# Patient Record
Sex: FEMALE | Race: WHITE | NOT HISPANIC OR LATINO | Employment: UNEMPLOYED | URBAN - NONMETROPOLITAN AREA
[De-identification: names, ages, dates, MRNs, and addresses within clinical notes are randomized per-mention and may not be internally consistent; named-entity substitution may affect disease eponyms.]

---

## 2024-09-24 ENCOUNTER — HOSPITAL ENCOUNTER (EMERGENCY)
Facility: HOSPITAL | Age: 64
Discharge: HOME | End: 2024-09-24
Attending: STUDENT IN AN ORGANIZED HEALTH CARE EDUCATION/TRAINING PROGRAM
Payer: MEDICARE

## 2024-09-24 VITALS
OXYGEN SATURATION: 97 % | WEIGHT: 140 LBS | TEMPERATURE: 97 F | HEIGHT: 65 IN | HEART RATE: 89 BPM | SYSTOLIC BLOOD PRESSURE: 133 MMHG | RESPIRATION RATE: 17 BRPM | BODY MASS INDEX: 23.32 KG/M2 | DIASTOLIC BLOOD PRESSURE: 84 MMHG

## 2024-09-24 DIAGNOSIS — T85.528A GASTROJEJUNOSTOMY TUBE DISLODGEMENT: Primary | ICD-10-CM

## 2024-09-24 PROCEDURE — 99283 EMERGENCY DEPT VISIT LOW MDM: CPT

## 2024-09-24 RX ORDER — POLYETHYLENE GLYCOL 3350 17 G/17G
17 POWDER, FOR SOLUTION ORAL DAILY
COMMUNITY
Start: 2024-09-17

## 2024-09-24 RX ORDER — ACETAMINOPHEN 325 MG/1
650 TABLET ORAL EVERY 6 HOURS PRN
COMMUNITY
Start: 2024-09-17

## 2024-09-24 RX ORDER — VENLAFAXINE HYDROCHLORIDE 75 MG/1
75 CAPSULE, EXTENDED RELEASE ORAL DAILY
COMMUNITY

## 2024-09-24 RX ORDER — FOLIC ACID 1 MG/1
1 TABLET ORAL DAILY
COMMUNITY
Start: 2024-09-17

## 2024-09-24 RX ORDER — HYDROXYZINE HYDROCHLORIDE 25 MG/1
25 TABLET, FILM COATED ORAL EVERY 6 HOURS PRN
COMMUNITY
Start: 2024-09-17

## 2024-09-24 RX ORDER — DAPAGLIFLOZIN 10 MG/1
10 TABLET, FILM COATED ORAL DAILY
COMMUNITY

## 2024-09-24 RX ORDER — LANOLIN ALCOHOL/MO/W.PET/CERES
100 CREAM (GRAM) TOPICAL DAILY
COMMUNITY
Start: 2024-09-17

## 2024-09-24 RX ORDER — BIOTIN 10 MG
10000 TABLET ORAL DAILY
COMMUNITY

## 2024-09-24 RX ORDER — FAMOTIDINE 20 MG/1
20 TABLET, FILM COATED ORAL 2 TIMES DAILY
COMMUNITY
Start: 2024-09-17

## 2024-09-24 RX ORDER — GABAPENTIN 400 MG/1
800 CAPSULE ORAL 3 TIMES DAILY
COMMUNITY
Start: 2024-09-17

## 2024-09-24 RX ORDER — CETIRIZINE HYDROCHLORIDE 10 MG/1
10 TABLET ORAL DAILY
COMMUNITY

## 2024-09-24 RX ORDER — CAPSAICIN 0.75 MG/G
1 CREAM TOPICAL 3 TIMES DAILY
COMMUNITY
Start: 2024-09-17

## 2024-09-24 RX ORDER — AMOXICILLIN 250 MG
1 CAPSULE ORAL 2 TIMES DAILY
COMMUNITY
Start: 2024-09-17

## 2024-09-24 RX ORDER — GUAIFENESIN 600 MG/1
600 TABLET, EXTENDED RELEASE ORAL
COMMUNITY
Start: 2024-09-17 | End: 2024-10-17

## 2024-09-24 RX ORDER — OXYCODONE AND ACETAMINOPHEN 10; 325 MG/1; MG/1
1 TABLET ORAL EVERY 6 HOURS PRN
COMMUNITY

## 2024-09-24 RX ORDER — INSULIN GLARGINE 100 [IU]/ML
35 INJECTION, SOLUTION SUBCUTANEOUS
COMMUNITY
Start: 2024-09-17

## 2024-09-24 RX ORDER — ATORVASTATIN CALCIUM 40 MG/1
40 TABLET, FILM COATED ORAL DAILY
COMMUNITY

## 2024-09-24 NOTE — ED NOTES
Att I called [facility] for transport of pt back to [facility]

## 2024-09-24 NOTE — ED PROVIDER NOTES
[facility] EMERGENCY DEPARTMENT  Emergency Department Encounter  Emergency Medicine Physician Note       Pt Name: Tram Isaacs  MRN: [id number]  Pt :   1960  Room Number:  1414  Date of encounter:  2024  PCP: Tod Weems MD  ED Physician: Malik Beck DO    HPI:  Tram Isaacs is a 64 y.o. female who presents to the ED with chief complaint of GJ tube dislodgement.  Patient presents to [facility] re[facility].  GJ tube became displaced throughout the night last night.  Patient was brought to the ED for evaluation.  Upon arrival, patient is alert, but is confused.  She is unsure how the GJ tube became displaced.  She denies any medical complaints.    HPI limited secondary to mental status.    PAST MEDICAL HISTORY  No past medical history on file.  Current Outpatient Medications   Medication Instructions    acetaminophen (TYLENOL) 650 mg, Oral, Every 6 Hours PRN    apixaban (ELIQUIS) 5 mg, Oral    atorvastatin (LIPITOR) 40 mg, Oral, Daily    Biotin 10,000 mcg, Oral, Daily    capsaicin (ZOSTRIX) 0.075 % topical cream 1 Application, Topical, 3 Times Daily    cetirizine (ZYRTEC) 10 mg, Oral, Daily    cyanocobalamin (VITAMIN B-12) 250 mcg, Oral, Daily    dapagliflozin Propanediol 10 mg, Oral, Daily    diphenhydrAMINE-acetaminophen (TYLENOL PM)  MG tablet per tablet 1 tablet, Oral    famotidine (PEPCID) 20 mg, Oral, 2 Times Daily    folic acid (FOLVITE) 1 mg, Oral, Daily    gabapentin (NEURONTIN) 800 mg, Oral, 3 Times Daily    guaiFENesin (MUCINEX) 600 mg, Oral    hydrOXYzine (ATARAX) 25 mg, Oral, Every 6 Hours PRN    insulin glargine (LANTUS) 35 Units, Subcutaneous    naloxone (NARCAN) 4 mg, Nasal    pancrelipase, Lip-Prot-Amyl, (CREON) 71810-27428 units capsule delayed-release particles capsule 2 capsules, Oral    polyethylene glycol (MIRALAX) 17 g, Oral, Daily    sennosides-docusate (PERICOLACE) 8.6-50 MG per tablet 1 tablet, Oral, 2 Times Daily    thiamine (VITAMIN  B1) 100 mg, Oral, Daily    venlafaxine XR (EFFEXOR-XR) 75 mg, Oral, Daily      PAST SURGICAL HISTORY  No past surgical history on file.    FAMILY HISTORY  No family history on file.    SOCIAL HISTORY  Social History     Socioeconomic History    Marital status:      ALLERGIES  Patient has no known allergies.    REVIEW OF SYSTEMS  All systems reviewed and negative except for those discussed in HPI.     PHYSICAL EXAM  ED Triage Vitals [09/24/24 0939]   Temp Heart Rate Resp BP SpO2   97 °F (36.1 °C) 100 16 146/80 97 %      Temp src Heart Rate Source Patient Position BP Location FiO2 (%)   Axillary Monitor Sitting Left arm --     I have reviewed the triage vital signs and nursing notes.    General: Alert.  Appears chronically ill, but nontoxic.  No acute distress.  Head: Normocephalic.  Atraumatic.  Eyes: No scleral icterus.  ENT: Moist mucous membranes.  Tracheostomy stoma present.  Cardiovascular: Regular rate and rhythm.  No murmurs.  No rubs.  2+ distal pulses bilaterally.  Respiratory: Equal breath sounds bilaterally.  No rales.  No rhonchi.  No wheezing.  GI: Stoma epigastric region.  Abdomen is soft.  Nondistended.  Nontender to palpation.  No rebound.  No guarding.  No CVA tenderness.  MSK: Moves all 4 extremities.  Neurologic: Confused with complex questioning.  No focal deficits.  Skin: No erythema.  No edema. No pallor. No cyanosis.    LAB RESULTS  No results found for this or any previous visit (from the past 24 hour(s)).    RADIOLOGY  No Radiology Exams Resulted Within Past 24 Hours    PROCEDURES  Procedures    RISK STRATIFICATION    MEDICAL DECISION MAKING  64 y.o. female with past medical history listed above who presents with GJ tube displacement.    Patient arrives via EMS.  I have reviewed the EMS documentation/notes and included that information in my HPI.    Vital signs within normal limits.    Medical screening exam completed.    Patient unable to provide complex history secondary to  confusion.    I was able to speak to the patient's son/power of , Elvis Coburncott, via telephone.  Additional history obtained.  Patient was hospitalized in June of this year for DKA and substance abuse.  Required intubation and prolonged ventilation.  Underwent multiple surgeries on her pancreas.  Ultimately required tracheostomy and GJ tube placement.  Patient was discharged to skilled nursing facility/rehab last month.  Patient does eat during the day, but they use GJ tube at night for Ensure.  He is unsure if she even uses the Ensure at night.  Reports that since discharge from the hospital previously patient is normally confused.  This is reportedly her baseline. Shared decision making discussed with the patient's son in regards to how to proceed with GJ tube dislodgement. Patient was informed of the indication, benefits, alternative diagnostic options, and risks including, but not limited to missed and/or delayed diagnosis, therefore leading to failure to treat. After discussion patient's son wishes to leave GJ tube out. Does not want surgical consultation for replacement. The patient's son is clinically not intoxicated, free from distracting pain, appears to have intact insight, judgment and reason and in my medical opinion has the capacity to make medical decisions on behalf of the patient.    No clinical indication for labs or imaging.    Patient was deemed medically stable for discharge back to skilled nursing facility with close outpatient follow-up and strict ED return precautions.    Social determinants of health impacting treatment or disposition: None    REPEAT VITAL SIGNS  AS OF 10:37 EDT VITALS:  BP - 146/80  HR - 100  TEMP - 97 °F (36.1 °C) (Axillary)  O2 SATS - 97%    DIAGNOSIS  Final diagnoses:   Gastrojejunostomy tube dislodgement     DISPOSITION  ED Disposition       ED Disposition   Discharge    Condition   Stable    Comment   --               PATIENT REFERRALS  Tod Weems MD  [address]  [facility] [id number] [phone]      PCP. 48 hours.            Please note that portions of this document were completed with voice recognition software.        Malik Beck DO  09/25/24 0953

## 2024-12-23 ENCOUNTER — READMISSION MANAGEMENT (OUTPATIENT)
Dept: CALL CENTER | Facility: HOSPITAL | Age: 64
End: 2024-12-23
Payer: MEDICARE

## 2024-12-24 NOTE — OUTREACH NOTE
Prep Survey      Flowsheet Row Responses   [facility] facility patient discharged from? Non-BH   Is LACE score < 7 ? Non-BH Discharge   Eligibility Not Eligible   What are the reasons patient is not eligible? Other  [Non [facility] provider]   Does the patient have one of the following disease processes/diagnoses(primary or secondary)? Other   Prep survey completed? Yes            ENE VO - Registered Nurse

## 2025-02-18 ENCOUNTER — READMISSION MANAGEMENT (OUTPATIENT)
Dept: CALL CENTER | Facility: HOSPITAL | Age: 65
End: 2025-02-18
Payer: MEDICARE

## 2025-02-19 NOTE — OUTREACH NOTE
Prep Survey      Flowsheet Row Responses   [facility] facility patient discharged from? Non-BH   Is LACE score < 7 ? Non-BH Discharge   Eligibility Not Eligible   What are the reasons patient is not eligible? Other  [no [facility] pcp appt noted]   Does the patient have one of the following disease processes/diagnoses(primary or secondary)? Other   Prep survey completed? Yes            TAL SELLERS - Registered Nurse